# Patient Record
Sex: FEMALE | Race: WHITE
[De-identification: names, ages, dates, MRNs, and addresses within clinical notes are randomized per-mention and may not be internally consistent; named-entity substitution may affect disease eponyms.]

---

## 2019-03-25 ENCOUNTER — HOSPITAL ENCOUNTER (OUTPATIENT)
Dept: HOSPITAL 63 - MAMMO | Age: 64
Discharge: HOME | End: 2019-03-25
Attending: PHYSICIAN ASSISTANT
Payer: COMMERCIAL

## 2019-03-25 DIAGNOSIS — Z12.31: Primary | ICD-10-CM

## 2019-03-25 PROCEDURE — 77067 SCR MAMMO BI INCL CAD: CPT

## 2019-03-25 PROCEDURE — 77063 BREAST TOMOSYNTHESIS BI: CPT

## 2019-04-04 NOTE — RAD
DATE: 3/25/2019   



EXAM: MAMMO [facility] SCREENING BILATERAL



HISTORY: Routine screening   



COMPARISON: None available   



This study was interpreted with the benefit of Computerized Aided Detection

(CAD).





Breast Density: HETERO The breast parenchyma is heterogenously dense, which

could reduce sensitivity of mammography. Breast parenchyma level C.





FINDINGS: 2-D and 3-D tomosynthesis imaging was performed in CC and MLO

projections.  There are several smooth nodules in the lateral aspects of both

breasts.  These have a benign appearance and are probably intramammary lymph

nodes.  No spiculated mass or architectural distortion is seen.  Minimal

benign type calcification is present.  No suspicious microcalcifications are

evident.  





IMPRESSION: Small smooth lateral breast nodules which are probably

intramammary lymph nodes.  Sonographic evaluation is suggested.







BI-RADS CATEGORY: 0 INCOMPLETE: NEEDS ADDITIONAL IMAGING EVALUATION AND/OR

PRIOR MAMMOGRAMS FOR COMPARISON.



RECOMMENDED FOLLOW-UP: ADD ADDITIONAL IMAGING



PQRS compliance statement: Patient information was entered into a reminder

system with a target due date     for the next mammogram.



Mammography is a sensitive method for finding small breast cancers, but it

does not detect them all and is not a substitute for careful clinical

examination.  A negative mammogram does not negate a clinically suspicious

finding and should not result in delay in biopsying a clinically suspicious

abnormality.



"Our facility is accredited by the [employer] Mammography

Program."

## 2019-04-17 ENCOUNTER — HOSPITAL ENCOUNTER (OUTPATIENT)
Dept: HOSPITAL 63 - US | Age: 64
Discharge: HOME | End: 2019-04-17
Attending: PHYSICIAN ASSISTANT
Payer: COMMERCIAL

## 2019-04-17 DIAGNOSIS — N63.11: Primary | ICD-10-CM

## 2019-04-17 DIAGNOSIS — N63.21: ICD-10-CM

## 2019-04-17 PROCEDURE — 76641 ULTRASOUND BREAST COMPLETE: CPT

## 2019-04-17 NOTE — RAD
Bilateral breast ultrasound, 4/17/2019:



History: Abnormal mammograms



A targeted ultrasound exam of the lateral aspects of both breasts was

performed. 



On the right at the 10:00 location approximately 7 cm from the nipple there is

a 5 mm hypoechoic nodule with an echogenic hilum.  Its margins are smooth. 

The appearance is compatible with an intramammary lymph node.



Also on the right at the 9:00 location approximately 4 cm from the nipple

there is a similar small 6 mm nodule compatible with a intramammary lymph

node.



On the left at the 3:00 location approximately 4 cm from the nipple there is a

cluster of 2 small hypoechoic nodules each measuring approximately 3 mm. 

These also probably represent lymph nodes.



No suspicious mass or unusual fluid collection is identified.





IMPRESSION: Probably benign small bilateral intramammary lymph nodes. 

Follow-up bilateral mammography in 6 months and then at yearly intervals is

suggested to confirm stability.



BI-RADS 3-probably benign findings

## 2019-11-05 ENCOUNTER — HOSPITAL ENCOUNTER (OUTPATIENT)
Dept: HOSPITAL 63 - MAMMO | Age: 64
Discharge: HOME | End: 2019-11-05
Attending: PHYSICIAN ASSISTANT
Payer: COMMERCIAL

## 2019-11-05 DIAGNOSIS — R92.8: Primary | ICD-10-CM

## 2019-11-05 PROCEDURE — 77066 DX MAMMO INCL CAD BI: CPT

## 2019-11-05 NOTE — RAD
DATE: 11/5/2019.



EXAM: DIGITAL DIAGNOSTIC BILATERAL.



HISTORY: 6 month follow-up mammographic nodules.



COMPARISON: 3/25/2019.



This study was interpreted with the benefit of Computerized Aided Detection

(CAD).



FINDINGS:



Breast Density: SCATTERED The breast parenchyma shows scattered fibroglandular

densities. Breast parenchyma level B..



The nodules of prior concern bilaterally are reniform and consistent with

benign intramammary lymph nodes. There are no suspicious masses,

calcifications or architectural distortion.



BI-RADS CATEGORY: 2 BENIGN FINDING(S).



RECOMMENDED FOLLOW-UP: 12M 12 MONTH FOLLOW-UP.



PQRS compliance statement: Patient information was entered into a reminder

system with a target due date 11/5/2020 for the next mammogram.



Mammography is a sensitive method for finding small breast cancers, but it

does not detect them all and is not a substitute for careful clinical

examination.  A negative mammogram does not negate a clinically suspicious

finding and should not result in delay in biopsying a clinically suspicious

abnormality.



"Our facility is accredited by the [employer] Mammography

Program."

## 2020-12-29 ENCOUNTER — HOSPITAL ENCOUNTER (OUTPATIENT)
Dept: HOSPITAL 63 - MAMMO | Age: 65
End: 2020-12-29
Attending: PHYSICIAN ASSISTANT
Payer: COMMERCIAL

## 2020-12-29 DIAGNOSIS — Z12.31: Primary | ICD-10-CM

## 2020-12-29 PROCEDURE — 77063 BREAST TOMOSYNTHESIS BI: CPT

## 2020-12-29 PROCEDURE — 77067 SCR MAMMO BI INCL CAD: CPT

## 2020-12-30 NOTE — RAD
DATE: 12/29/2020 9:46 AM



EXAM: MAMMO [facility] SCREENING BILATERAL



HISTORY:  Screening



COMPARISON: 11/5/2019



Bilateral CC and MLO views of the breasts were performed. Bilateral breast

tomosynthesis was performed in CC and MLO projections.



This study was interpreted with the benefit of Computerized Aided Detection

(CAD).





FINDINGS:



Breast Density: SCATTERED  The breast parenchyma shows scattered

fibroglandular densities. Breast parenchyma level B





No suspicious masses, microcalcifications or architectural distortion is

present to suggest malignancy in either breast.





The visualized axillae are unremarkable. 



IMPRESSION: No mammographic evidence of malignancy. 



BI-RADS CATEGORY: 1 NEGATIVE



RECOMMENDED FOLLOW-UP: 12M 12 MONTH FOLLOW-UP Annual screening mammography is

recommended, unless clinically indicated sooner based on symptoms or change in

physical exam.



PQRS compliance statement: Patient information was entered into a reminder

system with a target due date for the next mammogram.



Mammography is a sensitive method for finding small breast cancers, but it

does not detect them all and is not a substitute for careful clinical

examination.  A negative mammogram does not negate a clinically suspicious

finding and should not result in delay in biopsying a clinically suspicious

abnormality.



"Our facility is accredited by the [employer] Mammography

Program."